# Patient Record
Sex: FEMALE | Race: WHITE | Employment: FULL TIME | URBAN - METROPOLITAN AREA
[De-identification: names, ages, dates, MRNs, and addresses within clinical notes are randomized per-mention and may not be internally consistent; named-entity substitution may affect disease eponyms.]

---

## 2021-05-31 ENCOUNTER — RECORDS - HEALTHEAST (OUTPATIENT)
Dept: ADMINISTRATIVE | Facility: CLINIC | Age: 21
End: 2021-05-31

## 2021-09-28 ENCOUNTER — OFFICE VISIT (OUTPATIENT)
Dept: FAMILY MEDICINE | Facility: CLINIC | Age: 21
End: 2021-09-28
Payer: COMMERCIAL

## 2021-09-28 VITALS
HEART RATE: 75 BPM | RESPIRATION RATE: 12 BRPM | BODY MASS INDEX: 48.26 KG/M2 | HEIGHT: 65 IN | TEMPERATURE: 97.9 F | SYSTOLIC BLOOD PRESSURE: 128 MMHG | OXYGEN SATURATION: 97 % | DIASTOLIC BLOOD PRESSURE: 79 MMHG

## 2021-09-28 DIAGNOSIS — R07.0 THROAT PAIN: ICD-10-CM

## 2021-09-28 DIAGNOSIS — H57.11 ACUTE RIGHT EYE PAIN: ICD-10-CM

## 2021-09-28 DIAGNOSIS — Z11.52 ENCOUNTER FOR SCREENING LABORATORY TESTING FOR COVID-19 VIRUS: ICD-10-CM

## 2021-09-28 DIAGNOSIS — H10.31 ACUTE CONJUNCTIVITIS OF RIGHT EYE, UNSPECIFIED ACUTE CONJUNCTIVITIS TYPE: Primary | ICD-10-CM

## 2021-09-28 DIAGNOSIS — R05.9 COUGH: ICD-10-CM

## 2021-09-28 LAB
DEPRECATED S PYO AG THROAT QL EIA: NEGATIVE
GROUP A STREP BY PCR: DETECTED

## 2021-09-28 PROCEDURE — U0005 INFEC AGEN DETEC AMPLI PROBE: HCPCS | Performed by: PHYSICIAN ASSISTANT

## 2021-09-28 PROCEDURE — 99214 OFFICE O/P EST MOD 30 MIN: CPT | Performed by: PHYSICIAN ASSISTANT

## 2021-09-28 PROCEDURE — U0003 INFECTIOUS AGENT DETECTION BY NUCLEIC ACID (DNA OR RNA); SEVERE ACUTE RESPIRATORY SYNDROME CORONAVIRUS 2 (SARS-COV-2) (CORONAVIRUS DISEASE [COVID-19]), AMPLIFIED PROBE TECHNIQUE, MAKING USE OF HIGH THROUGHPUT TECHNOLOGIES AS DESCRIBED BY CMS-2020-01-R: HCPCS | Performed by: PHYSICIAN ASSISTANT

## 2021-09-28 PROCEDURE — 87651 STREP A DNA AMP PROBE: CPT | Performed by: PHYSICIAN ASSISTANT

## 2021-09-28 RX ORDER — OFLOXACIN 3 MG/ML
SOLUTION/ DROPS OPHTHALMIC
Qty: 4 ML | Refills: 0 | Status: SHIPPED | OUTPATIENT
Start: 2021-09-28 | End: 2021-10-05

## 2021-09-28 RX ORDER — METHYLPREDNISOLONE 4 MG
TABLET, DOSE PACK ORAL
Qty: 21 TABLET | Refills: 0 | Status: SHIPPED | OUTPATIENT
Start: 2021-09-28

## 2021-09-28 ASSESSMENT — PAIN SCALES - GENERAL: PAINLEVEL: NO PAIN (0)

## 2021-09-28 NOTE — PATIENT INSTRUCTIONS
Due to your cough and congestion, we are screening for COVID-19.  Your rapid strep test is negative.  Because you have been experiencing cough since your initial COVID-19 diagnosis back in October 2020, I am placing you on a steroid taper.  Please follow CDC guidelines for quarantine and return to work.  I have added these guidelines below.    Your eye exam is concerning for iritis which is inflammation of the colored part of your eye.  For treatment, I have prescribed antibiotic eyedrops.  I have also placed a call to one of the [facility] eye doctors as your eye clinic does not have a doctor on staff today.  I will call you once I hear back from them.  As discussed, if your eye symptoms severely worsen while we are waiting to hear back, please go to the emergency department.  [facility] emergency rooms do have eye doctors on staff.    Please reach out with questions or concerns.  Return to clinic for new or worsening symptoms.    Instructions for Patients  Your symptoms show that you may have coronavirus (COVID-19). This illness can cause fever, cough and trouble breathing. Many people get a mild case and get better on their own. Some people can get very sick.     Not all patients are tested for COVID-19. If you need to be tested, your care team will let you know.    How can I protect others?    Without a test, we can t know for sure that you have COVID-19. For safety, it s very important to follow these rules.    Stay home and away from others (self-isolate) until:    You ve had no fever--and no medicine that reduces fever--for 1 full day (24 hours), And     Your other symptoms have resolved (gotten better). For example, your cough or breathing has improved, And     At least 10 days have passed since your symptoms started.    During this time:    Stay in your own room (and use your own bathroom), if you can.    Stay away from others in your home. No hugging, kissing or shaking hands.    No  visitors.    Don t go to work, school or anywhere else.     Clean  high touch  surfaces often (doorknobs, counters, handles, etc.). Use a household cleaning spray or wipes.    Cover your mouth and nose with a mask, tissue or washcloth to avoid spreading germs.    Wash your hands and face often. Use soap and water.    For more tips, go to https://www.cdc.gov/coronavirus/2019-ncov/downloads/10Things.pdf.    How can I take care of myself?    1. Get lots of rest. Drink extra fluids (unless a doctor has told you not to).     2. Take Tylenol (acetaminophen) for fever or pain. If you have liver or kidney problems, ask your family doctor if it's okay to take Tylenol.     Adults can take either:     650 mg (two 325 mg pills) every 4 to 6 hours, or     1,000 mg (two 500 mg pills) every 8 hours as needed.     Note: Don't take more than 3,000 mg in one day.   Acetaminophen is found in many medicines (both prescribed and over-the-counter medicines). Read all labels to be sure you don't take too much.   For children, check the Tylenol bottle for the right dose. The dose is based on  the child's age or weight.    3. If you have other health problems (like cancer, heart failure, an organ transplant or severe kidney disease): Call your specialty clinic if you don't feel better in the next 2 days.    4. Know when to call 911: If your breathing is so bad that it keeps you from doing normal activities, call 911 or go to the emergency room. Tell them that you've been staying home and may have COVID-19.      Thank you for taking steps to prevent the spread of this virus.  o Limit your contact with others.  o Wear a simple mask to cover your cough.  o Wash your hands well and often.  o If you need medical care, go to [email or web address] or contact your health care provider.     For more about COVID-19 and caring for yourself at home, visit the CDC website at https://www.cdc.gov/coronavirus/2019-ncov/about/steps-when-sick.html.      To learn about care at [facility], please go to [email or web address].     [facility] clinical trials (COVID-19 research studies): [email or web address].    Below are the COVID-19 hotlines at the [place] Department of [facility] ([facility]). Interpreters are available.     For health questions: Call 659-102-6836 or 1-469.573.8080 (7 a.m. to 7 p.m.)    For questions about schools and childcare: Call 494-423-2391 or 1-522.499.5806 (7 a.m. to 7 p.m.)        Patient Education     Understanding Iritis    Iritis is the inflammation of the iris, which is the colored part of the eye. It causes pain, sensitivity to light, and other problems. Iritis can lead to severe eyesight loss and even blindness.   How to say it  [id number]  How iritis affects the eye  The front of the eye is covered by a strong, clear layer called the cornea. Behind it is a fluid-filled space called the anterior chamber. Behind that is the iris. The iris is the colored part of your eye. In the center of the iris is the pupil. It opens and closes to control how much light enters your eye. The middle part of the eye is called the uvea. It includes the iris.   With iritis, the iris and anterior chamber are inflamed. Iritis is a type of uveitis. This is an inflammation of the uvea. Uveitis is a leading cause of blindness.   What causes iritis?  Iritis can be caused by many things such as:     Infection from bacteria, viruses, parasites, or fungi    Inflammatory autoimmune diseases like ankylosing spondylitis, lupus, sarcoidosis, or juvenile arthritis    Certain other health conditions such as leukemia or Kawasaki syndrome    Reactions to medicines    Eye injury  In many cases, the cause of iritis is not known.   Symptoms of iritis  You may have symptoms in one or both eyes. Symptoms range from mild to severe and can include:     Eye pain    Redness    Light sensitivity    Some loss of  eyesight    Headache    Irregularly shaped pupils  You may have only a single episode of iritis. But if your iritis is caused by an inflammatory disease, you may have iritis more than once. You may also have symptoms that are more severe.   Diagnosing iritis  Your eye care doctor (ophthalmologist) will ask about your past health and give you an eye exam. He or she may look into your eye with a slit lamp microscope. This is a device that magnifies the surface and inside of your eye. Your doctor may also put a dye into your eye. This lets him or her look at your cornea.   Your eye doctor may try to find the cause of your iritis. You may need tests such as:     Blood tests to check for infection or autoimmune diseases    Chest X-ray or chest CT scan to look for sarcoidosis    Testing of fluid from your eye to look for certain rare causes    X-ray of your sacroiliac joint in the pelvis to check for signs of a disease that can lead to iritis  [employer] last reviewed this educational content on 4/1/2020 2000-2021 [employer]. All rights reserved. This information is not intended as a substitute for professional medical care. Always follow your healthcare professional's instructions.           Patient Education     Treatment for Iritis     Follow-up exams are important to help prevent complications from iritis.     Iritis is the inflammation of parts of the eye. It causes pain, sensitivity to light, and other problems. Iritis can lead to severe eyesight loss and even blindness.  Types of treatment          Treatment depends on what is causing your iritis and how severe it is. Treatment should be done quickly to prevent damage to the eyes. Treatments may include:    Antibiotics to treat a bacterial infection of your eye    Antiviral medicines to treat a viral infection of your eye    Steroid medicines to treat inflammation    Eye drops to dilate your eye. This may prevent some complications.    In rare  cases, immunosuppressive medicines.  You may have medicines as eye drops, by mouth, through an IV (intravenous) line, or as a shot (injection) around your eye.  Possible complications of iritis  If iritis is treated right away, it will likely not cause any other problems. But in some cases it can cause complications. Your risk for these varies depending on your age, your health, and the cause of your iritis. Possible complications include:    Glaucoma, from increased pressure inside the eye    Iris attachment to other parts of the eye (synechiae)    Clouding of the lens (cataract)    Inflammation of the fluid in the back portion of the eye (vitreous)    Inflammation of the retina    Swelling of the back of the eye (macular edema)    Calcium deposits on the cornea (band keratopathy)    Optic nerve damage  If severe, problems like these can cause partial or total loss of eyesight. Your eye doctor will try to prevent these problems by treating your iritis right away. You may need to take medicine often to calm the inflammation in your eye.  You may need medicine or surgery to treat some problems. For example, medicine may be given to treat glaucoma. Or you may need surgery to remove a cataract or your cornea.  If you re at risk for iritis  If you have an autoimmune disease, take your medicines as prescribed. This may help to prevent iritis. You may reduce your chance for complications if you see your eye care doctor at the first sign of symptoms. Make sure to keep any follow-up appointments. These are to make sure your iritis responds to treatment.  If you have certain health conditions, you may need regular eye exams to check for early signs of iritis. Keep all your appointments. This may help prevent complications from iritis.  When to call the healthcare provider  Call your healthcare provider right away if you have any of these:    Eye pain    Some eyesight loss    Symptoms that get worse, or don t get better with  treatment    New symptoms   [employer] last reviewed this educational content on 5/1/2018 2000-2021 [employer]. All rights reserved. This information is not intended as a substitute for professional medical care. Always follow your healthcare professional's instructions.

## 2021-09-28 NOTE — LETTER
September 28, 2021      Alfonso Miller  [address] [place] [address]        To Whom It May Concern:    Alfonso Miller was seen in our clinic. She may return to work with negative test results without restrictions.      Sincerely,        Rut Castellano PA-C

## 2021-09-28 NOTE — PROGRESS NOTES
Assessment & Plan     Cough  Throat pain  Encounter for screening laboratory testing for COVID-19 virus  Patient is a 21-year-old female who presents to clinic due to 3 days of worsening cough from baseline.  Patient notes chronic cough since diagnosis of COVID-19 October 2020.  She is vaccinated against COVID-19.  Patient also notes congestion and sore throat.  Vital signs normal.    Differential diagnosis includes viral URI, including possibly COVID-19.  Rapid strep negative.  COVID-19 PCR in progress.  Discussed symptomatic treatment with rest, hydration, Tylenol.  Given chronic cough that is worsening, steroid taper prescribed.  Discuss symptoms or urgent/emergent follow-up.    - Symptomatic COVID-19 Virus (Coronavirus) by PCR Nose  - Streptococcus A Rapid Screen w/Reflex to PCR - Clinic Collect  - Group A Streptococcus PCR Throat Swab  - methylPREDNISolone (MEDROL DOSEPAK) 4 MG tablet therapy pack; Follow Package Directions    Acute conjunctivitis of right eye, unspecified acute conjunctivitis type  Acute right eye pain  Patient also notes right eye pain that started yesterday as foreign body sensation.  Patient is contact wearer and did remove contacts at that time.  Since this morning, patient has developed photophobia, described as stabbing eye pain, significant conjunctival injection, cloudy vision.  Fluorescein stain with Woods lamp exam without signs of abrasion/fluorescein uptake.  History and exam findings are concerning for iritis which may be viral. Due to foreign body sensation and history of contact use, Ofloxacin prescribed.  Contacted patient's eye clinic who does not have provider on staff today.  Patient's eye clinic does recommend contacting [facility] as next step.  Clinic consulted and notified of patients symptoms, concerns for iritis, pending COVID-19 testing, patient symptoms, history of COVID-19, and vaccination status.  Clinic is agreeable to seeing patient today at 5 PM.   "Patient notified and will have contact drive her to this visit.  Discuss symptoms that warrant urgent/emergent follow-up.      - ofloxacin (OCUFLOX) 0.3 % ophthalmic solution; Place 1-2 drops into the right eye every 2 hours (while awake) for 2 days, THEN 1-2 drops 4 times daily for 5 days.         See Patient Instructions    Return in about 1 week (around 10/5/2021), or if symptoms worsen or fail to improve.    MARGY Michel [facility]    Call patient: [phone]    Ivelisse Hamilton is a 21 year old who presents for the following health issues     HPI     Acute Illness  Acute illness concerns: cough and red right eye. Patient wears contacts and yesterday afternoon felt like she scratched her right eye or had something in it.She took her contacts out and has not been wearing them in the right eye since. Starting this morning eye pain has increased significantly and patient notes cloudy vision, watering, and redness of the eye. She notes bright lights feel like someone is stabbing the eye. Patient [facility] eye [facility] at [facility].       Onset/Duration: cough off and on since having covid19 in October 2020, worsening for 3 days.  Patient is vaccinated against COVID-19.  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  Physical Exam  Vitals and nursing note reviewed.   Constitutional:       General: She is not in acute distress.     Appearance: Normal appearance.   HENT:      Head: Normocephalic and atraumatic.      Mouth/Throat:      Mouth: Mucous membranes are moist.      Pharynx: Oropharynx is clear.   Eyes:      General: Lids are normal.         Right eye: Discharge (watery ) present. No foreign body.         Left eye: No foreign body or discharge.      Extraocular Movements: Extraocular movements intact.      Right eye: Normal extraocular motion.      Left eye: Normal extraocular motion.      Conjunctiva/sclera:      Right eye: Right conjunctiva is injected. No exudate or hemorrhage.     Left eye: Left conjunctiva is not injected. No exudate or hemorrhage.     Pupils: Pupils are equal, round, and reactive to light.      Right eye: Pupil is round, reactive and not sluggish. No corneal abrasion or fluorescein uptake. Corona exam negative.      Left eye: Pupil is round, reactive and not sluggish.      Slit lamp exam:     Right eye: Photophobia present.      Left eye: No photophobia.   Cardiovascular:      Rate and Rhythm: Normal rate and regular rhythm.      Heart sounds: Normal heart sounds.   Pulmonary:      Effort: Pulmonary effort is normal.      Breath sounds: Normal breath sounds. No wheezing, rhonchi or rales.      Comments: Dry cough  Musculoskeletal:         General: Normal range of motion.      Cervical back: Normal range of motion.   Skin:     General: Skin is warm and dry.   Neurological:      General: No focal deficit present.      Mental Status: She is alert.   Psychiatric:         Mood and Affect: Mood normal.         Behavior: Behavior normal.        Results for orders placed or performed in visit on 09/28/21   Streptococcus A Rapid Screen w/Reflex to PCR - Clinic Collect     Status: Normal    Specimen: Throat; Swab   Result Value Ref  Range    Group A Strep antigen Negative Negative

## 2021-09-29 DIAGNOSIS — J02.0 STREPTOCOCCAL SORE THROAT: Primary | ICD-10-CM

## 2021-09-29 LAB — SARS-COV-2 RNA RESP QL NAA+PROBE: NEGATIVE

## 2021-09-29 RX ORDER — AMOXICILLIN 500 MG/1
500 CAPSULE ORAL 2 TIMES DAILY
Qty: 20 CAPSULE | Refills: 0 | Status: SHIPPED | OUTPATIENT
Start: 2021-09-29 | End: 2021-10-09